# Patient Record
Sex: FEMALE | Race: WHITE | NOT HISPANIC OR LATINO | Employment: OTHER | URBAN - METROPOLITAN AREA
[De-identification: names, ages, dates, MRNs, and addresses within clinical notes are randomized per-mention and may not be internally consistent; named-entity substitution may affect disease eponyms.]

---

## 2020-08-27 ENCOUNTER — TELEPHONE (OUTPATIENT)
Dept: NEUROLOGY | Facility: HOSPITAL | Age: 75
End: 2020-08-27

## 2020-08-27 DIAGNOSIS — Z03.818 ENCOUNTER FOR OBSERVATION FOR SUSPECTED EXPOSURE TO OTHER BIOLOGICAL AGENTS RULED OUT: ICD-10-CM

## 2020-08-27 DIAGNOSIS — Z12.11 SPECIAL SCREENING FOR MALIGNANT NEOPLASMS, COLON: Primary | ICD-10-CM

## 2020-08-27 NOTE — TELEPHONE ENCOUNTER
New pt requesting colonoscopy with Dr. Dennison for fobt, positive stool.  Screen colonoscopy scheduled with pt on Monday, September 21, 2020 at [facility].  Pt informed covid 19 test required 72 hours prior to procedure., to be scheduled upon follow up call with pt.  Pt in the process of moving, request to be contacted with prep instructions in 1 week.  Pt will have previous gi send records to this clinic.  Pt given fax and mailing information and repeated correctly.

## 2020-09-17 ENCOUNTER — TELEPHONE (OUTPATIENT)
Dept: NEUROLOGY | Facility: HOSPITAL | Age: 75
End: 2020-09-17

## 2020-09-17 NOTE — TELEPHONE ENCOUNTER
Covid 19 test scheduled with pt on Friday, September 18, 2020 at 1130am at [facility] at [address] [facility], 1st floor.  Suprep instructions with pt and informed sent to Rolanda at [place] and [place]. SUPREP Instructions    [facility]  [address]  Clinic Office [phone]  Endoscopy Lab [phone]    You are scheduled for a Colonoscopy with  on Monday, September 21, 2020 at [facility].  You will check in at the Information desk on the first floor of the hospital. Please contact the office to reschedule if needed.     A responsible adult (family member or friend) must come with you and transport you home.  You are not allowed to drive, take a taxi/ride share or bus or leave the Endoscopy Center alone.  If you do not have a responsible adult with you to take you home, your exam will be cancelled.      Please follow instructions of  if you are taking anticoagulant/blood thinning medications such as Aggrenox, Brilinta, Effient, Eliquis, Lovenox, Plavix, Pletal, Pradaxa, Ticilid, Xarelto or Coumadin.      Please skip your morning dose of insulin or other oral medications for diabetes the morning of the procedure unless instructed otherwise by your doctor. You should take your blood pressure, heart, anti-rejection and or seizure medication the morning of your procedure.     To ensure that your test is accurate and complete, you must follow these instructions - please do not use the instructions provided from the pharmacy.      The Day Before The Procedure:     Follow a CLEAR LIQUID DIET for the entire day before your scheduled colonoscopy.  This means no solid food the entire day.   A clear liquid diet includes the following:  o Water, Coffee or decaffeinated coffee (no milk or cream)  o Tea, Herbal tea  o Carbonated beverages (soft drinks), regular and sugar free  o Gelatin  o Apple Juice, white grape juice, white  cranberry juice  o Gatorade, Power Aid, Crystal Light, Bhanu Aid (Yellow, Green, Clear)  o Lemonade and Limeade  o Bouillon, clear consomme'  o Snowball, popsicles  (Yellow, Green, Clear)    DO NOT DRINK ANY LIQUIDS CONTAINING RED DYE  DO NOT DRINK ANY LIQUIDS NOT SPECIFICALLY LISTED  DO NOT DRINK ALCOHOL     At 5 pm the evening before your colonoscopy:  o Pour one (1) bottle of SUPREP into the container provided in the box. Add water to the line on the container and mix well.  Drink the whole container and then drink 2 more containers of water over 1 hour.  - This is sometimes easier to drink if this solution is cold, so you can mix the solution 20 minutes ahead of time and place in the refrigerator prior to drinking.  You have to drink the solution within 30-45 minutes of mixing it.  Do NOT put this solution over ice.  It IS ok to drink with a straw.    The Day of the Procedure - The Endoscopy department will call you 2 days prior to your procedure to give you the exact time     5 hours prior to your ARRIVAL TIME (this may be in the middle of the night)  o Pour the 2nd bottle of SUPREP into the container provided in the box.  Add water to the line on the container and mix well.  Drink the whole container and then drink 2 more containers of water over 1 hour.    o AFTER YOU HAVE COMPLETED YOUR PREP, YOU MAY HAVE NOTHING ELSE BY MOUTH    o Please leave all valuables and jewelry at home.    o At 600 am, you may take the last dose of any medications you are allowed to take with a small sip of water (blood pressure, heart, anti-rejection and or seizure medication).  If you use inhalers bring them with you.    o You may call the Endoscopy department at [phone] with any questions regarding your procedure.

## 2020-09-18 ENCOUNTER — HOSPITAL ENCOUNTER (OUTPATIENT)
Dept: PREADMISSION TESTING | Facility: OTHER | Age: 75
Discharge: HOME | End: 2020-09-18
Attending: ANESTHESIOLOGY
Payer: MEDICARE

## 2020-09-18 DIAGNOSIS — Z03.818 ENCOUNTER FOR OBSERVATION FOR SUSPECTED EXPOSURE TO OTHER BIOLOGICAL AGENTS RULED OUT: ICD-10-CM

## 2020-09-18 LAB — SARS-COV-2 RNA RESP QL NAA+PROBE: NOT DETECTED

## 2020-09-18 PROCEDURE — U0003 INFECTIOUS AGENT DETECTION BY NUCLEIC ACID (DNA OR RNA); SEVERE ACUTE RESPIRATORY SYNDROME CORONAVIRUS 2 (SARS-COV-2) (CORONAVIRUS DISEASE [COVID-19]), AMPLIFIED PROBE TECHNIQUE, MAKING USE OF HIGH THROUGHPUT TECHNOLOGIES AS DESCRIBED BY CMS-2020-01-R: HCPCS

## 2020-09-20 NOTE — H&P
[facility]    CC: positive FOBT    HPI 75 y.o. female community referral otherwise healthy here to evaluate new, acute, painless, positive FOBT without overt GI blood loss. Last colonoscopy 2010 normal. No FHx colon cancer.    Chart reviewed and summarized here.    Past Medical History none    Past Surgical History none    Social History no tobacco, rare etoh, no drugs    Family History sister breast cancer    Review of Systems  General ROS: negative for chills, fever or weight loss  Psychological ROS: negative for hallucination, depression or suicidal ideation  Ophthalmic ROS: negative for blurry vision, photophobia or eye pain  ENT ROS: negative for epistaxis, sore throat or rhinorrhea  Respiratory ROS: no cough, shortness of breath, or wheezing  Cardiovascular ROS: no chest pain or dyspnea on exertion  Gastrointestinal ROS: no abdominal pain, change in bowel habits, or black/ bloody stools  Genito-Urinary ROS: no dysuria, trouble voiding, or hematuria  Musculoskeletal ROS: negative for gait disturbance or muscular weakness  Neurological ROS: no syncope or seizures; no ataxia  Dermatological ROS: negative for pruritis, rash and jaundice    Physical Examination  There were no vitals taken for this visit.  General appearance: alert, cooperative, no distress  HENT: Normocephalic, atraumatic, neck symmetrical, no nasal discharge   Eyes: conjunctivae/corneas clear, PERRL, EOM's intact  Lungs: clear to auscultation bilaterally, no dullness to percussion bilaterally  Heart: regular rate and rhythm without rub; no displacement of the PMI   Abdomen: soft, non-tender; bowel sounds normoactive; no organomegaly  Extremities: extremities symmetric; no clubbing, cyanosis, or edema  Integument: Skin color, texture, turgor normal; no rashes; hair distrubution normal  Neurologic: Alert and oriented X 3, normal strength, normal coordination and gait  Psychiatric: no pressured speech; normal affect; no evidence of impaired  cognition     Labs: personally reviewed cbc cmp nml 2017, FOBT positive    Outside records reviewed and summarized here    Assessment:   76yo F otherwise healthy with positive FOBT who's last colonoscopy in 2010 was normal.    Plan:   Proceed with colonoscopy today    George Dennison MD   [address], [address]   [place] [address] [phone]

## 2020-09-21 ENCOUNTER — HOSPITAL ENCOUNTER (OUTPATIENT)
Facility: HOSPITAL | Age: 75
Discharge: HOME | End: 2020-09-21
Attending: INTERNAL MEDICINE | Admitting: INTERNAL MEDICINE
Payer: MEDICARE

## 2020-09-21 ENCOUNTER — ANESTHESIA EVENT (OUTPATIENT)
Dept: ENDOSCOPY | Facility: HOSPITAL | Age: 75
End: 2020-09-21
Payer: MEDICARE

## 2020-09-21 ENCOUNTER — ANESTHESIA (OUTPATIENT)
Dept: ENDOSCOPY | Facility: HOSPITAL | Age: 75
End: 2020-09-21
Payer: MEDICARE

## 2020-09-21 VITALS
DIASTOLIC BLOOD PRESSURE: 86 MMHG | HEIGHT: 68 IN | BODY MASS INDEX: 23.49 KG/M2 | WEIGHT: 155 LBS | RESPIRATION RATE: 16 BRPM | TEMPERATURE: 98 F | HEART RATE: 52 BPM | OXYGEN SATURATION: 99 % | SYSTOLIC BLOOD PRESSURE: 106 MMHG

## 2020-09-21 DIAGNOSIS — Z12.11 COLON CANCER SCREENING: Primary | ICD-10-CM

## 2020-09-21 DIAGNOSIS — R19.5 FECAL OCCULT BLOOD TEST POSITIVE: ICD-10-CM

## 2020-09-21 PROCEDURE — 63600175 PHARM REV CODE 636 W HCPCS: Performed by: NURSE ANESTHETIST, CERTIFIED REGISTERED

## 2020-09-21 PROCEDURE — 45378 DIAGNOSTIC COLONOSCOPY: CPT | Performed by: INTERNAL MEDICINE

## 2020-09-21 PROCEDURE — 25000003 PHARM REV CODE 250: Performed by: INTERNAL MEDICINE

## 2020-09-21 PROCEDURE — 37000008 HC ANESTHESIA 1ST 15 MINUTES: Performed by: INTERNAL MEDICINE

## 2020-09-21 PROCEDURE — 37000009 HC ANESTHESIA EA ADD 15 MINS: Performed by: INTERNAL MEDICINE

## 2020-09-21 PROCEDURE — G0121 COLON CA SCRN NOT HI RSK IND: HCPCS | Performed by: INTERNAL MEDICINE

## 2020-09-21 PROCEDURE — 25000003 PHARM REV CODE 250: Performed by: NURSE ANESTHETIST, CERTIFIED REGISTERED

## 2020-09-21 RX ORDER — LIDOCAINE HCL/PF 100 MG/5ML
SYRINGE (ML) INTRAVENOUS
Status: DISCONTINUED | OUTPATIENT
Start: 2020-09-21 | End: 2020-09-21

## 2020-09-21 RX ORDER — CALCIUM CARBONATE 600 MG
600 TABLET ORAL ONCE
COMMUNITY

## 2020-09-21 RX ORDER — UBIDECARENONE 75 MG
500 CAPSULE ORAL DAILY
COMMUNITY

## 2020-09-21 RX ORDER — SODIUM CHLORIDE 0.9 % (FLUSH) 0.9 %
10 SYRINGE (ML) INJECTION
Status: DISCONTINUED | OUTPATIENT
Start: 2020-09-21 | End: 2020-09-21 | Stop reason: HOSPADM

## 2020-09-21 RX ORDER — MULTIVITAMIN
1 TABLET ORAL DAILY
COMMUNITY

## 2020-09-21 RX ORDER — SODIUM CHLORIDE 9 MG/ML
INJECTION, SOLUTION INTRAVENOUS CONTINUOUS
Status: DISCONTINUED | OUTPATIENT
Start: 2020-09-21 | End: 2020-09-21 | Stop reason: HOSPADM

## 2020-09-21 RX ORDER — PROPOFOL 10 MG/ML
VIAL (ML) INTRAVENOUS CONTINUOUS PRN
Status: DISCONTINUED | OUTPATIENT
Start: 2020-09-21 | End: 2020-09-21

## 2020-09-21 RX ORDER — PROPOFOL 10 MG/ML
VIAL (ML) INTRAVENOUS
Status: DISCONTINUED | OUTPATIENT
Start: 2020-09-21 | End: 2020-09-21

## 2020-09-21 RX ADMIN — PROPOFOL 150 MCG/KG/MIN: 10 INJECTION, EMULSION INTRAVENOUS at 09:09

## 2020-09-21 RX ADMIN — LIDOCAINE HYDROCHLORIDE 40 MG: 20 INJECTION, SOLUTION INTRAVENOUS at 09:09

## 2020-09-21 RX ADMIN — PROPOFOL 70 MG: 10 INJECTION, EMULSION INTRAVENOUS at 09:09

## 2020-09-21 RX ADMIN — SODIUM CHLORIDE: 0.9 INJECTION, SOLUTION INTRAVENOUS at 08:09

## 2020-09-21 NOTE — ANESTHESIA POSTPROCEDURE EVALUATION
Anesthesia Post Evaluation    Patient: Cindy Julian    Procedure(s) Performed: Procedure(s) (LRB):  COLONOSCOPY (N/A)    Final Anesthesia Type: MAC    Patient location during evaluation: GI PACU  Patient participation: Yes- Able to Participate  Level of consciousness: awake and alert  Post-procedure vital signs: reviewed and stable  Pain management: adequate  Airway patency: patent    PONV status at discharge: No PONV  Anesthetic complications: no      Cardiovascular status: hemodynamically stable  Respiratory status: unassisted, spontaneous ventilation and room air  Hydration status: euvolemic  Follow-up not needed.          Vitals Value Taken Time   /87 09/21/20 0806   Temp 36.8 °C (98.2 °F) 09/21/20 0805   Pulse 68 09/21/20 0805   Resp 18 09/21/20 0805   SpO2 98 % 09/21/20 0805         No case tracking events are documented in the log.      Pain/Rebekah Score: No data recorded

## 2020-09-21 NOTE — TRANSFER OF CARE
"Anesthesia Transfer of Care Note    Patient: Cindy Julian    Procedure(s) Performed: Procedure(s) (LRB):  COLONOSCOPY (N/A)    Patient location: GI    Anesthesia Type: MAC    Transport from OR: Transported from OR on room air with adequate spontaneous ventilation    Post pain: adequate analgesia    Post assessment: no apparent anesthetic complications and tolerated procedure well    Post vital signs: stable    Level of consciousness: responds to stimulation and sedated    Nausea/Vomiting: no nausea/vomiting    Complications: none    Transfer of care protocol was followed      Last vitals:   Visit Vitals  BP (!) 175/87   Pulse 68   Temp 36.8 °C (98.2 °F) (Temporal)   Resp 18   Ht 5' 8" (1.727 m)   Wt 70.3 kg (155 lb)   SpO2 98%   Breastfeeding No   BMI 23.57 kg/m²     "

## 2020-09-21 NOTE — [employer] PATIENT INSTRUCTIONS
Discharge Summary/Instructions after an Endoscopic Procedure  Patient Name: Cindy Gnun  Patient MRN: [id number]  Patient   YOB: 1945  Monday, September 21, 2020  George Dennison MD  Your health is very important to us during the Covid Crisis. Following your   procedure today, you will receive a daily text for 2 weeks asking about   signs or symptoms of Covid 19.  Please respond to this text when you   receive it so we can follow up and keep you as safe as possible.   RESTRICTIONS:  During your procedure today, you received medications for sedation.  These   medications may affect your judgment, balance and coordination.  Therefore,   for 24 hours, you have the following restrictions:   - DO NOT drive a car, operate machinery, make legal/financial decisions,   sign important papers or drink alcohol.    ACTIVITY:  Today: no heavy lifting, straining or running due to procedural   sedation/anesthesia.  The following day: return to full activity including work.  DIET:  Eat and drink normally unless instructed otherwise.     TREATMENT FOR COMMON SIDE EFFECTS:  - Mild abdominal pain, nausea, belching, bloating or excessive gas:  rest,   eat lightly and use a heating pad.  - Sore Throat: treat with throat lozenges and/or gargle with warm salt   water.  - Because air was used during the procedure, expelling large amounts of air   from your rectum or belching is normal.  - If a bowel prep was taken, you may not have a bowel movement for 1-3 days.    This is normal.  SYMPTOMS TO WATCH FOR AND REPORT TO YOUR PHYSICIAN:  1. Abdominal pain or bloating, other than gas cramps.  2. Chest pain.  3. Back pain.  4. Signs of infection such as: chills or fever occurring within 24 hours   after the procedure.  5. Rectal bleeding, which would show as bright red, maroon, or black stools.   (A tablespoon of blood from the rectum is not serious, especially if   hemorrhoids are present.)  6. Vomiting.  7. Weakness or  dizziness.  GO DIRECTLY TO THE NEAREST EMERGENCY ROOM IF YOU HAVE ANY OF THE FOLLOWING:      Difficulty breathing              Chills and/or fever over 101 F   Persistent vomiting and/or vomiting blood   Severe abdominal pain   Severe chest pain   Black, tarry stools   Bleeding- more than one tablespoon   Any other symptom or condition that you feel may need urgent attention  Your doctor recommends these additional instructions:  If any biopsies were taken, your doctors clinic will contact you in 1 to 2   weeks with any results.  Repeat screening colonoscopy in 10 years.  Discharge to home   Condition stable   Resume previous diet   The signs and symptoms of potential delayed complications were discussed   with the patient. If signs or symptoms of these complications develop, call   the [facility] at [phone].   Return to normal activities tomorrow.  Written discharge instructions were   provided to the patient.   - Resume previous diet.   - Repeat colonoscopy in 10 years for screening purposes.   - Discharge patient to home.  For questions, problems or results please call your physician - George Dennison MD.  EMERGENCY PHONE NUMBER: [phone],  LAB RESULTS: [phone]  IF A COMPLICATION OR EMERGENCY SITUATION ARISES AND YOU ARE UNABLE TO REACH   YOUR PHYSICIAN - GO DIRECTLY TO THE EMERGENCY ROOM.  MD George Sarmiento MD  9/21/2020 10:59:22 AM  This report has been verified and signed electronically.  [employer]

## 2020-09-21 NOTE — ANESTHESIA PREPROCEDURE EVALUATION
09/21/2020  Cindy Julian is a 75 y.o., female for colonoscopy under MAC    History reviewed. No pertinent past medical history.  History reviewed. No pertinent surgical history.      Anesthesia Evaluation    I have reviewed the Patient Summary Reports.   I have reviewed the NPO Status.   I have reviewed the Medications.     Review of Systems  Social:  Non-Smoker        Physical Exam  General:  Well nourished    Airway/Jaw/Neck:  Airway Findings: Mallampati: II      Chest/Lungs:  Chest/Lungs Clear    Heart/Vascular:  Heart Findings: Normal            Anesthesia Plan  Type of Anesthesia, risks & benefits discussed:  Anesthesia Type:  MAC  Patient's Preference:   Intra-op Monitoring Plan: standard ASA monitors  Intra-op Monitoring Plan Comments:   Post Op Pain Control Plan:   Post Op Pain Control Plan Comments:   Induction:    Beta Blocker:  Patient is not currently on a Beta-Blocker (No further documentation required).       Informed Consent: Patient understands risks and agrees with Anesthesia plan.  Questions answered. Anesthesia consent signed with patient.  ASA Score: 1     Day of Surgery Review of History & Physical:            Ready For Surgery From Anesthesia Perspective.

## 2021-01-09 ENCOUNTER — IMMUNIZATION (OUTPATIENT)
Dept: INTERNAL MEDICINE | Facility: CLINIC | Age: 76
End: 2021-01-09
Payer: MEDICARE

## 2021-01-09 DIAGNOSIS — Z23 NEED FOR VACCINATION: ICD-10-CM

## 2021-01-09 PROCEDURE — 91300 COVID-19, MRNA, LNP-S, PF, 30 MCG/0.3 ML DOSE VACCINE: CPT | Mod: PBBFAC | Performed by: FAMILY MEDICINE

## 2021-01-30 ENCOUNTER — IMMUNIZATION (OUTPATIENT)
Dept: INTERNAL MEDICINE | Facility: CLINIC | Age: 76
End: 2021-01-30
Payer: MEDICARE

## 2021-01-30 DIAGNOSIS — Z23 NEED FOR VACCINATION: Primary | ICD-10-CM

## 2021-01-30 PROCEDURE — 91300 COVID-19, MRNA, LNP-S, PF, 30 MCG/0.3 ML DOSE VACCINE: CPT | Mod: PBBFAC | Performed by: FAMILY MEDICINE

## 2021-01-30 PROCEDURE — 0002A COVID-19, MRNA, LNP-S, PF, 30 MCG/0.3 ML DOSE VACCINE: CPT | Mod: PBBFAC | Performed by: FAMILY MEDICINE

## 2021-03-18 ENCOUNTER — PATIENT MESSAGE (OUTPATIENT)
Dept: RESEARCH | Facility: HOSPITAL | Age: 76
End: 2021-03-18

## 2021-03-26 ENCOUNTER — PATIENT MESSAGE (OUTPATIENT)
Dept: RESEARCH | Facility: HOSPITAL | Age: 76
End: 2021-03-26

## 2021-11-19 ENCOUNTER — HOSPITAL ENCOUNTER (EMERGENCY)
Facility: HOSPITAL | Age: 76
Discharge: HOME | End: 2021-11-19
Attending: EMERGENCY MEDICINE
Payer: MEDICARE

## 2021-11-19 VITALS
DIASTOLIC BLOOD PRESSURE: 81 MMHG | WEIGHT: 155 LBS | OXYGEN SATURATION: 96 % | TEMPERATURE: 98 F | RESPIRATION RATE: 18 BRPM | HEART RATE: 61 BPM | HEIGHT: 68 IN | SYSTOLIC BLOOD PRESSURE: 184 MMHG | BODY MASS INDEX: 23.49 KG/M2

## 2021-11-19 DIAGNOSIS — S52.502A CLOSED FRACTURE OF DISTAL END OF LEFT RADIUS, UNSPECIFIED FRACTURE MORPHOLOGY, INITIAL ENCOUNTER: Primary | ICD-10-CM

## 2021-11-19 DIAGNOSIS — S69.90XA WRIST INJURY: ICD-10-CM

## 2021-11-19 PROBLEM — S62.102A CLOSED FRACTURE OF LEFT WRIST: Status: ACTIVE | Noted: 2021-11-19

## 2021-11-19 PROCEDURE — 99283 EMERGENCY DEPT VISIT LOW MDM: CPT | Mod: 57,,, | Performed by: ORTHOPAEDIC SURGERY

## 2021-11-19 PROCEDURE — 25600 CLTX DST RDL FX/EPHYS SEP WO: CPT | Mod: LT,,, | Performed by: ORTHOPAEDIC SURGERY

## 2021-11-19 PROCEDURE — 99284 EMERGENCY DEPT VISIT MOD MDM: CPT | Mod: ,,, | Performed by: EMERGENCY MEDICINE

## 2021-11-19 PROCEDURE — 99284 PR EMERGENCY DEPT VISIT,LEVEL IV: ICD-10-PCS | Mod: ,,, | Performed by: EMERGENCY MEDICINE

## 2021-11-19 PROCEDURE — 99285 EMERGENCY DEPT VISIT HI MDM: CPT | Mod: 25

## 2021-11-19 PROCEDURE — 25605 CLTX DST RDL FX/EPHYS SEP W/: CPT

## 2021-11-19 PROCEDURE — 25600 ORTHOPEDIC INJURY TREATMENT: ICD-10-PCS | Mod: LT,,, | Performed by: ORTHOPAEDIC SURGERY

## 2021-11-19 PROCEDURE — 99283 PR EMERGENCY DEPT VISIT,LEVEL III: ICD-10-PCS | Mod: 57,,, | Performed by: ORTHOPAEDIC SURGERY

## 2021-11-22 ENCOUNTER — TELEPHONE (OUTPATIENT)
Dept: ORTHOPEDICS | Facility: CLINIC | Age: 76
End: 2021-11-22
Payer: MEDICARE

## 2021-11-22 DIAGNOSIS — M25.532 LEFT WRIST PAIN: Primary | ICD-10-CM

## 2021-11-23 ENCOUNTER — OFFICE VISIT (OUTPATIENT)
Dept: ORTHOPEDICS | Facility: CLINIC | Age: 76
End: 2021-11-23
Payer: MEDICARE

## 2021-11-23 VITALS
WEIGHT: 155 LBS | DIASTOLIC BLOOD PRESSURE: 86 MMHG | BODY MASS INDEX: 23.49 KG/M2 | HEART RATE: 74 BPM | SYSTOLIC BLOOD PRESSURE: 134 MMHG | HEIGHT: 68 IN

## 2021-11-23 DIAGNOSIS — S52.502A CLOSED FRACTURE OF DISTAL END OF LEFT RADIUS, UNSPECIFIED FRACTURE MORPHOLOGY, INITIAL ENCOUNTER: Primary | ICD-10-CM

## 2021-11-23 PROCEDURE — 99999 PR PBB SHADOW E&M-EST. PATIENT-LVL III: CPT | Mod: PBBFAC,,, | Performed by: ORTHOPAEDIC SURGERY

## 2021-11-23 PROCEDURE — 99204 PR OFFICE/OUTPT VISIT, NEW, LEVL IV, 45-59 MIN: ICD-10-PCS | Mod: S$GLB,,, | Performed by: ORTHOPAEDIC SURGERY

## 2021-11-23 PROCEDURE — 99999 PR PBB SHADOW E&M-EST. PATIENT-LVL III: ICD-10-PCS | Mod: PBBFAC,,, | Performed by: ORTHOPAEDIC SURGERY

## 2021-11-23 PROCEDURE — 99204 OFFICE O/P NEW MOD 45 MIN: CPT | Mod: S$GLB,,, | Performed by: ORTHOPAEDIC SURGERY

## 2021-11-24 ENCOUNTER — ANESTHESIA EVENT (OUTPATIENT)
Dept: SURGERY | Facility: HOSPITAL | Age: 76
End: 2021-11-24
Payer: MEDICARE

## 2021-11-26 ENCOUNTER — TELEPHONE (OUTPATIENT)
Dept: ORTHOPEDICS | Facility: CLINIC | Age: 76
End: 2021-11-26
Payer: MEDICARE

## 2021-11-26 DIAGNOSIS — Z98.890 POST-OPERATIVE STATE: Primary | ICD-10-CM

## 2021-11-26 RX ORDER — IBUPROFEN 600 MG/1
600 TABLET ORAL 3 TIMES DAILY PRN
Qty: 45 TABLET | Refills: 0 | Status: SHIPPED | OUTPATIENT
Start: 2021-11-26

## 2021-11-26 RX ORDER — OXYCODONE AND ACETAMINOPHEN 5; 325 MG/1; MG/1
1 TABLET ORAL EVERY 6 HOURS PRN
Qty: 10 TABLET | Refills: 0 | Status: SHIPPED | OUTPATIENT
Start: 2021-11-26

## 2021-11-26 RX ORDER — ACETAMINOPHEN 500 MG
1000 TABLET ORAL 2 TIMES DAILY PRN
Qty: 48 TABLET | Refills: 0 | Status: SHIPPED | OUTPATIENT
Start: 2021-11-26

## 2021-11-26 RX ORDER — OXYCODONE AND ACETAMINOPHEN 10; 325 MG/1; MG/1
1 TABLET ORAL EVERY 6 HOURS PRN
Qty: 10 TABLET | Refills: 0 | Status: SHIPPED | OUTPATIENT
Start: 2021-11-26 | End: 2021-11-26

## 2021-11-27 DIAGNOSIS — S52.509A DISTAL RADIUS FRACTURE: ICD-10-CM

## 2021-11-29 ENCOUNTER — ANESTHESIA (OUTPATIENT)
Dept: SURGERY | Facility: HOSPITAL | Age: 76
End: 2021-11-29
Payer: MEDICARE

## 2021-11-29 ENCOUNTER — HOSPITAL ENCOUNTER (OUTPATIENT)
Facility: HOSPITAL | Age: 76
Discharge: HOME | End: 2021-11-29
Attending: ORTHOPAEDIC SURGERY | Admitting: ORTHOPAEDIC SURGERY
Payer: MEDICARE

## 2021-11-29 VITALS
OXYGEN SATURATION: 100 % | DIASTOLIC BLOOD PRESSURE: 70 MMHG | SYSTOLIC BLOOD PRESSURE: 153 MMHG | TEMPERATURE: 98 F | HEART RATE: 50 BPM | HEIGHT: 68 IN | WEIGHT: 152 LBS | RESPIRATION RATE: 16 BRPM | BODY MASS INDEX: 23.04 KG/M2

## 2021-11-29 DIAGNOSIS — S52.502A CLOSED FRACTURE OF DISTAL END OF LEFT RADIUS, UNSPECIFIED FRACTURE MORPHOLOGY, INITIAL ENCOUNTER: Primary | ICD-10-CM

## 2021-11-29 DIAGNOSIS — S52.509A DISTAL RADIUS FRACTURE: ICD-10-CM

## 2021-11-29 PROCEDURE — 63600175 PHARM REV CODE 636 W HCPCS: Performed by: STUDENT IN AN ORGANIZED HEALTH CARE EDUCATION/TRAINING PROGRAM

## 2021-11-29 PROCEDURE — 25609 PR OPEN RX DISTAL RADIUS FX, INTRA-ARTICULAR, 3+ FRAG: ICD-10-PCS | Mod: LT,,, | Performed by: ORTHOPAEDIC SURGERY

## 2021-11-29 PROCEDURE — C1769 GUIDE WIRE: HCPCS | Performed by: ORTHOPAEDIC SURGERY

## 2021-11-29 PROCEDURE — 25609 OPTX DST RD XART FX/EP SEP3+: CPT | Mod: LT,,, | Performed by: ORTHOPAEDIC SURGERY

## 2021-11-29 PROCEDURE — D9220A PRA ANESTHESIA: Mod: ANES,,, | Performed by: ANESTHESIOLOGY

## 2021-11-29 PROCEDURE — D9220A PRA ANESTHESIA: ICD-10-PCS | Mod: CRNA,,, | Performed by: NURSE ANESTHETIST, CERTIFIED REGISTERED

## 2021-11-29 PROCEDURE — 64415 PR NERVE BLOCK INJ, ANES/STEROID, BRACHIAL PLEXUS, INCL IMAG GUIDANCE: ICD-10-PCS | Mod: 59,LT,, | Performed by: ANESTHESIOLOGY

## 2021-11-29 PROCEDURE — 25000003 PHARM REV CODE 250: Performed by: ANESTHESIOLOGY

## 2021-11-29 PROCEDURE — 63600175 PHARM REV CODE 636 W HCPCS: Performed by: NURSE ANESTHETIST, CERTIFIED REGISTERED

## 2021-11-29 PROCEDURE — 25000003 PHARM REV CODE 250: Performed by: ORTHOPAEDIC SURGERY

## 2021-11-29 PROCEDURE — 64415 NJX AA&/STRD BRCH PLXS IMG: CPT | Mod: 59,LT,, | Performed by: ANESTHESIOLOGY

## 2021-11-29 PROCEDURE — 71000015 HC POSTOP RECOV 1ST HR: Performed by: ORTHOPAEDIC SURGERY

## 2021-11-29 PROCEDURE — C1713 ANCHOR/SCREW BN/BN,TIS/BN: HCPCS | Performed by: ORTHOPAEDIC SURGERY

## 2021-11-29 PROCEDURE — 76942 PR U/S GUIDANCE FOR NEEDLE GUIDANCE: ICD-10-PCS | Mod: 26,,, | Performed by: ANESTHESIOLOGY

## 2021-11-29 PROCEDURE — 99900035 HC TECH TIME PER 15 MIN (STAT)

## 2021-11-29 PROCEDURE — 25000003 PHARM REV CODE 250: Performed by: NURSE ANESTHETIST, CERTIFIED REGISTERED

## 2021-11-29 PROCEDURE — 37000008 HC ANESTHESIA 1ST 15 MINUTES: Performed by: ORTHOPAEDIC SURGERY

## 2021-11-29 PROCEDURE — 76942 ECHO GUIDE FOR BIOPSY: CPT | Performed by: ANESTHESIOLOGY

## 2021-11-29 PROCEDURE — 37000009 HC ANESTHESIA EA ADD 15 MINS: Performed by: ORTHOPAEDIC SURGERY

## 2021-11-29 PROCEDURE — 27201423 OPTIME MED/SURG SUP & DEVICES STERILE SUPPLY: Performed by: ORTHOPAEDIC SURGERY

## 2021-11-29 PROCEDURE — 63600175 PHARM REV CODE 636 W HCPCS: Performed by: ANESTHESIOLOGY

## 2021-11-29 PROCEDURE — 94761 N-INVAS EAR/PLS OXIMETRY MLT: CPT

## 2021-11-29 PROCEDURE — D9220A PRA ANESTHESIA: ICD-10-PCS | Mod: ANES,,, | Performed by: ANESTHESIOLOGY

## 2021-11-29 PROCEDURE — 36000710: Performed by: ORTHOPAEDIC SURGERY

## 2021-11-29 PROCEDURE — 36000711: Performed by: ORTHOPAEDIC SURGERY

## 2021-11-29 PROCEDURE — D9220A PRA ANESTHESIA: Mod: CRNA,,, | Performed by: NURSE ANESTHETIST, CERTIFIED REGISTERED

## 2021-11-29 PROCEDURE — 71000033 HC RECOVERY, INTIAL HOUR: Performed by: ORTHOPAEDIC SURGERY

## 2021-11-29 PROCEDURE — 76942 ECHO GUIDE FOR BIOPSY: CPT | Mod: 26,,, | Performed by: ANESTHESIOLOGY

## 2021-11-29 DEVICE — SCREW BNE N LOK HEXLB 3.5X12: Type: IMPLANTABLE DEVICE | Site: ARM | Status: FUNCTIONAL

## 2021-11-29 DEVICE — SCREW BONE LOK CONG 2.3X20MM: Type: IMPLANTABLE DEVICE | Site: ARM | Status: FUNCTIONAL

## 2021-11-29 DEVICE — SCREW BONE 2.3 X 16MM: Type: IMPLANTABLE DEVICE | Site: ARM | Status: FUNCTIONAL

## 2021-11-29 DEVICE — SCREW BONE 2.3 X 18MM: Type: IMPLANTABLE DEVICE | Site: ARM | Status: FUNCTIONAL

## 2021-11-29 DEVICE — GUIDEWIRE ORTHO .054 X 6 IN: Type: IMPLANTABLE DEVICE | Site: ARM | Status: FUNCTIONAL

## 2021-11-29 DEVICE — PLATE BONE ACULOC 2 STANDARD: Type: IMPLANTABLE DEVICE | Site: ARM | Status: FUNCTIONAL

## 2021-11-29 RX ORDER — BUPIVACAINE HYDROCHLORIDE AND EPINEPHRINE 5; 5 MG/ML; UG/ML
INJECTION, SOLUTION EPIDURAL; INTRACAUDAL; PERINEURAL
Status: COMPLETED | OUTPATIENT
Start: 2021-11-29 | End: 2021-11-29

## 2021-11-29 RX ORDER — ONDANSETRON 4 MG/1
8 TABLET, ORALLY DISINTEGRATING ORAL EVERY 8 HOURS PRN
Status: CANCELLED | OUTPATIENT
Start: 2021-11-29

## 2021-11-29 RX ORDER — SODIUM CHLORIDE 0.9 % (FLUSH) 0.9 %
10 SYRINGE (ML) INJECTION
Status: DISCONTINUED | OUTPATIENT
Start: 2021-11-29 | End: 2021-11-29 | Stop reason: HOSPADM

## 2021-11-29 RX ORDER — ACETAMINOPHEN 500 MG
1000 TABLET ORAL
Status: COMPLETED | OUTPATIENT
Start: 2021-11-29 | End: 2021-11-29

## 2021-11-29 RX ORDER — PROPOFOL 10 MG/ML
VIAL (ML) INTRAVENOUS
Status: DISCONTINUED | OUTPATIENT
Start: 2021-11-29 | End: 2021-11-29

## 2021-11-29 RX ORDER — CEFAZOLIN SODIUM 1 G/3ML
2 INJECTION, POWDER, FOR SOLUTION INTRAMUSCULAR; INTRAVENOUS
Status: COMPLETED | OUTPATIENT
Start: 2021-11-29 | End: 2021-11-29

## 2021-11-29 RX ORDER — OXYCODONE HYDROCHLORIDE 5 MG/1
5 TABLET ORAL EVERY 4 HOURS PRN
Status: CANCELLED | OUTPATIENT
Start: 2021-11-29

## 2021-11-29 RX ORDER — LIDOCAINE HYDROCHLORIDE 20 MG/ML
INJECTION INTRAVENOUS
Status: DISCONTINUED | OUTPATIENT
Start: 2021-11-29 | End: 2021-11-29

## 2021-11-29 RX ORDER — ACETAMINOPHEN 325 MG/1
650 TABLET ORAL EVERY 4 HOURS PRN
Status: CANCELLED | OUTPATIENT
Start: 2021-11-29

## 2021-11-29 RX ORDER — FENTANYL CITRATE 50 UG/ML
25 INJECTION, SOLUTION INTRAMUSCULAR; INTRAVENOUS EVERY 5 MIN PRN
Status: CANCELLED | OUTPATIENT
Start: 2021-11-29

## 2021-11-29 RX ORDER — PROPOFOL 10 MG/ML
VIAL (ML) INTRAVENOUS CONTINUOUS PRN
Status: DISCONTINUED | OUTPATIENT
Start: 2021-11-29 | End: 2021-11-29

## 2021-11-29 RX ORDER — ONDANSETRON 2 MG/ML
INJECTION INTRAMUSCULAR; INTRAVENOUS
Status: DISCONTINUED | OUTPATIENT
Start: 2021-11-29 | End: 2021-11-29

## 2021-11-29 RX ORDER — SODIUM CHLORIDE 0.9 % (FLUSH) 0.9 %
10 SYRINGE (ML) INJECTION
Status: CANCELLED | OUTPATIENT
Start: 2021-11-29

## 2021-11-29 RX ORDER — FENTANYL CITRATE 50 UG/ML
100 INJECTION, SOLUTION INTRAMUSCULAR; INTRAVENOUS
Status: DISCONTINUED | OUTPATIENT
Start: 2021-11-29 | End: 2021-11-29

## 2021-11-29 RX ORDER — MIDAZOLAM HYDROCHLORIDE 1 MG/ML
4 INJECTION INTRAMUSCULAR; INTRAVENOUS
Status: DISCONTINUED | OUTPATIENT
Start: 2021-11-29 | End: 2021-11-29

## 2021-11-29 RX ORDER — BACITRACIN ZINC 500 UNIT/G
OINTMENT (GRAM) TOPICAL
Status: DISCONTINUED | OUTPATIENT
Start: 2021-11-29 | End: 2021-11-29 | Stop reason: HOSPADM

## 2021-11-29 RX ORDER — CELECOXIB 200 MG/1
400 CAPSULE ORAL
Status: COMPLETED | OUTPATIENT
Start: 2021-11-29 | End: 2021-11-29

## 2021-11-29 RX ORDER — MIDAZOLAM HYDROCHLORIDE 1 MG/ML
0.5 INJECTION INTRAMUSCULAR; INTRAVENOUS
Status: DISCONTINUED | OUTPATIENT
Start: 2021-11-29 | End: 2021-11-29 | Stop reason: HOSPADM

## 2021-11-29 RX ORDER — FENTANYL CITRATE 50 UG/ML
25 INJECTION, SOLUTION INTRAMUSCULAR; INTRAVENOUS
Status: DISCONTINUED | OUTPATIENT
Start: 2021-11-29 | End: 2021-11-29 | Stop reason: HOSPADM

## 2021-11-29 RX ADMIN — PROPOFOL 50 MCG/KG/MIN: 10 INJECTION, EMULSION INTRAVENOUS at 11:11

## 2021-11-29 RX ADMIN — PROPOFOL 50 MG: 10 INJECTION, EMULSION INTRAVENOUS at 11:11

## 2021-11-29 RX ADMIN — CELECOXIB 400 MG: 200 CAPSULE ORAL at 09:11

## 2021-11-29 RX ADMIN — ACETAMINOPHEN 1000 MG: 500 TABLET ORAL at 09:11

## 2021-11-29 RX ADMIN — SODIUM CHLORIDE: 9 INJECTION, SOLUTION INTRAVENOUS at 11:11

## 2021-11-29 RX ADMIN — BUPIVACAINE HYDROCHLORIDE AND EPINEPHRINE BITARTRATE 30 ML: 5; .0091 INJECTION, SOLUTION EPIDURAL; INTRACAUDAL; PERINEURAL at 10:11

## 2021-11-29 RX ADMIN — MIDAZOLAM HYDROCHLORIDE 2 MG: 1 INJECTION, SOLUTION INTRAMUSCULAR; INTRAVENOUS at 10:11

## 2021-11-29 RX ADMIN — CEFAZOLIN 2 G: 330 INJECTION, POWDER, FOR SOLUTION INTRAMUSCULAR; INTRAVENOUS at 12:11

## 2021-11-29 RX ADMIN — LIDOCAINE HYDROCHLORIDE 75 MG: 20 INJECTION, SOLUTION INTRAVENOUS at 11:11

## 2021-11-29 RX ADMIN — ONDANSETRON 4 MG: 2 INJECTION, SOLUTION INTRAMUSCULAR; INTRAVENOUS at 12:11

## 2021-11-29 RX ADMIN — FENTANYL CITRATE 100 MCG: 50 INJECTION INTRAMUSCULAR; INTRAVENOUS at 10:11

## 2021-11-30 DIAGNOSIS — S52.502A CLOSED FRACTURE OF DISTAL END OF LEFT RADIUS, UNSPECIFIED FRACTURE MORPHOLOGY, INITIAL ENCOUNTER: Primary | ICD-10-CM

## 2021-12-08 ENCOUNTER — TELEPHONE (OUTPATIENT)
Dept: REHABILITATION | Facility: HOSPITAL | Age: 76
End: 2021-12-08
Payer: MEDICARE

## 2021-12-14 ENCOUNTER — OFFICE VISIT (OUTPATIENT)
Dept: ORTHOPEDICS | Facility: CLINIC | Age: 76
End: 2021-12-14
Payer: MEDICARE

## 2021-12-14 VITALS
BODY MASS INDEX: 23.04 KG/M2 | HEIGHT: 68 IN | HEART RATE: 74 BPM | SYSTOLIC BLOOD PRESSURE: 147 MMHG | DIASTOLIC BLOOD PRESSURE: 90 MMHG | WEIGHT: 152 LBS

## 2021-12-14 DIAGNOSIS — Z47.89 ORTHOPEDIC AFTERCARE: ICD-10-CM

## 2021-12-14 DIAGNOSIS — S52.502A CLOSED FRACTURE OF DISTAL END OF LEFT RADIUS, UNSPECIFIED FRACTURE MORPHOLOGY, INITIAL ENCOUNTER: Primary | ICD-10-CM

## 2021-12-14 PROCEDURE — 97760 PR ORTHOTIC MGMT&TRAINJ INITIAL ENC EA 15 MINS: ICD-10-PCS | Mod: GP,S$GLB,, | Performed by: PHYSICIAN ASSISTANT

## 2021-12-14 PROCEDURE — 99024 PR POST-OP FOLLOW-UP VISIT: ICD-10-PCS | Mod: S$GLB,,, | Performed by: PHYSICIAN ASSISTANT

## 2021-12-14 PROCEDURE — 97760 ORTHOTIC MGMT&TRAING 1ST ENC: CPT | Mod: GP,S$GLB,, | Performed by: PHYSICIAN ASSISTANT

## 2021-12-14 PROCEDURE — 99024 POSTOP FOLLOW-UP VISIT: CPT | Mod: S$GLB,,, | Performed by: PHYSICIAN ASSISTANT

## 2021-12-14 PROCEDURE — 99999 PR PBB SHADOW E&M-EST. PATIENT-LVL III: ICD-10-PCS | Mod: PBBFAC,,, | Performed by: PHYSICIAN ASSISTANT

## 2021-12-14 PROCEDURE — 99999 PR PBB SHADOW E&M-EST. PATIENT-LVL III: CPT | Mod: PBBFAC,,, | Performed by: PHYSICIAN ASSISTANT

## 2021-12-22 ENCOUNTER — CLINICAL SUPPORT (OUTPATIENT)
Dept: REHABILITATION | Facility: HOSPITAL | Age: 76
End: 2021-12-22
Payer: MEDICARE

## 2021-12-22 DIAGNOSIS — R29.898 DECREASED GRIP STRENGTH OF LEFT HAND: ICD-10-CM

## 2021-12-22 DIAGNOSIS — S52.502A CLOSED FRACTURE OF DISTAL END OF LEFT RADIUS, UNSPECIFIED FRACTURE MORPHOLOGY, INITIAL ENCOUNTER: ICD-10-CM

## 2021-12-22 DIAGNOSIS — M25.532 WRIST PAIN, LEFT: ICD-10-CM

## 2021-12-22 DIAGNOSIS — M25.60 RANGE OF MOTION DEFICIT: ICD-10-CM

## 2021-12-22 PROCEDURE — 97165 OT EVAL LOW COMPLEX 30 MIN: CPT

## 2021-12-22 PROCEDURE — 97110 THERAPEUTIC EXERCISES: CPT

## 2021-12-22 PROCEDURE — 97018 PARAFFIN BATH THERAPY: CPT | Mod: 59

## 2022-01-10 NOTE — PROGRESS NOTES
"Ms. Julian is here today for a post-operative visit.  She is 43 days status post ORIF left distal radius by Dr. De Leon on 11/29/2021. She reports that she is doing well. She is attending OT and regularly performing HEP.  She is icing as needed. She has been weaning out of the brace at home. Pain is mild. She denies fever, chills, and sweats since the time of the surgery.     Physical exam:    Vitals:    01/11/22 1124   BP: (!) 153/94   Pulse: 86   Weight: 68.9 kg (152 lb)   Height: 5' 8" (1.727 m)   PainSc:   4     Vital signs are stable, patient is afebrile.  Patient is well dressed and well groomed, no acute distress.  Alert and oriented to person, place, and time.  Incision is healing well - clean, dry, and intact.  There is no erythema or exudate.  There is no sign of any infection. She is NVI.   Good finger ROM, fair wrist ROM.  Good pronation and supination.    Assessment: 43 days status post ORIF left distal radius    Plan:  Cindy was seen today for post-op evaluation.    Diagnoses and all orders for this visit:    Closed fracture of distal end of left radius, unspecified fracture morphology, initial encounter    Orthopedic aftercare          - left forearm brace - wean out  - Regular OT and HEP  - No lifting or weight bearing, will gradually strengthen in OT  - Discussed XRay, will watch scapholunate interval;  Xrays reviewed and discussed with Dr. De Leon as well  - Follow up in 4-6 weeks with XRay  - Call with questions or concerns        PLAN FOR THE PATIENT: Pt will be NWB of operative extremity for 6 weeks. Pt will return to clinic at 2 weeks postop for a wound check and will be placed in a removable wrist brace to begin ROM at the wrist    "

## 2022-01-11 ENCOUNTER — CLINICAL SUPPORT (OUTPATIENT)
Dept: REHABILITATION | Facility: HOSPITAL | Age: 77
End: 2022-01-11
Payer: MEDICARE

## 2022-01-11 ENCOUNTER — TELEPHONE (OUTPATIENT)
Dept: ORTHOPEDICS | Facility: CLINIC | Age: 77
End: 2022-01-11
Payer: MEDICARE

## 2022-01-11 ENCOUNTER — HOSPITAL ENCOUNTER (OUTPATIENT)
Dept: RADIOLOGY | Facility: OTHER | Age: 77
Discharge: HOME | End: 2022-01-11
Attending: PHYSICIAN ASSISTANT
Payer: MEDICARE

## 2022-01-11 ENCOUNTER — OFFICE VISIT (OUTPATIENT)
Dept: ORTHOPEDICS | Facility: CLINIC | Age: 77
End: 2022-01-11
Payer: MEDICARE

## 2022-01-11 VITALS
SYSTOLIC BLOOD PRESSURE: 153 MMHG | BODY MASS INDEX: 23.04 KG/M2 | HEIGHT: 68 IN | DIASTOLIC BLOOD PRESSURE: 94 MMHG | HEART RATE: 86 BPM | WEIGHT: 152 LBS

## 2022-01-11 DIAGNOSIS — M25.60 RANGE OF MOTION DEFICIT: ICD-10-CM

## 2022-01-11 DIAGNOSIS — S52.502A CLOSED FRACTURE OF DISTAL END OF LEFT RADIUS, UNSPECIFIED FRACTURE MORPHOLOGY, INITIAL ENCOUNTER: ICD-10-CM

## 2022-01-11 DIAGNOSIS — Z47.89 ORTHOPEDIC AFTERCARE: ICD-10-CM

## 2022-01-11 DIAGNOSIS — S52.502A CLOSED FRACTURE OF DISTAL END OF LEFT RADIUS, UNSPECIFIED FRACTURE MORPHOLOGY, INITIAL ENCOUNTER: Primary | ICD-10-CM

## 2022-01-11 DIAGNOSIS — R29.898 DECREASED GRIP STRENGTH OF LEFT HAND: ICD-10-CM

## 2022-01-11 DIAGNOSIS — M25.532 WRIST PAIN, LEFT: ICD-10-CM

## 2022-01-11 PROCEDURE — 97140 MANUAL THERAPY 1/> REGIONS: CPT

## 2022-01-11 PROCEDURE — 3080F PR MOST RECENT DIASTOLIC BLOOD PRESSURE >= 90 MM HG: ICD-10-PCS | Mod: CPTII,S$GLB,, | Performed by: PHYSICIAN ASSISTANT

## 2022-01-11 PROCEDURE — 3077F PR MOST RECENT SYSTOLIC BLOOD PRESSURE >= 140 MM HG: ICD-10-PCS | Mod: CPTII,S$GLB,, | Performed by: PHYSICIAN ASSISTANT

## 2022-01-11 PROCEDURE — 97018 PARAFFIN BATH THERAPY: CPT | Mod: 59

## 2022-01-11 PROCEDURE — 73110 X-RAY EXAM OF WRIST: CPT | Mod: TC,FY,LT

## 2022-01-11 PROCEDURE — 1160F RVW MEDS BY RX/DR IN RCRD: CPT | Mod: CPTII,S$GLB,, | Performed by: PHYSICIAN ASSISTANT

## 2022-01-11 PROCEDURE — 1101F PR PT FALLS ASSESS DOC 0-1 FALLS W/OUT INJ PAST YR: ICD-10-PCS | Mod: CPTII,S$GLB,, | Performed by: PHYSICIAN ASSISTANT

## 2022-01-11 PROCEDURE — 1125F AMNT PAIN NOTED PAIN PRSNT: CPT | Mod: CPTII,S$GLB,, | Performed by: PHYSICIAN ASSISTANT

## 2022-01-11 PROCEDURE — 1159F MED LIST DOCD IN RCRD: CPT | Mod: CPTII,S$GLB,, | Performed by: PHYSICIAN ASSISTANT

## 2022-01-11 PROCEDURE — 3077F SYST BP >= 140 MM HG: CPT | Mod: CPTII,S$GLB,, | Performed by: PHYSICIAN ASSISTANT

## 2022-01-11 PROCEDURE — 1125F PR PAIN SEVERITY QUANTIFIED, PAIN PRESENT: ICD-10-PCS | Mod: CPTII,S$GLB,, | Performed by: PHYSICIAN ASSISTANT

## 2022-01-11 PROCEDURE — 99024 PR POST-OP FOLLOW-UP VISIT: ICD-10-PCS | Mod: S$GLB,,, | Performed by: PHYSICIAN ASSISTANT

## 2022-01-11 PROCEDURE — 99024 POSTOP FOLLOW-UP VISIT: CPT | Mod: S$GLB,,, | Performed by: PHYSICIAN ASSISTANT

## 2022-01-11 PROCEDURE — 3288F FALL RISK ASSESSMENT DOCD: CPT | Mod: CPTII,S$GLB,, | Performed by: PHYSICIAN ASSISTANT

## 2022-01-11 PROCEDURE — 97110 THERAPEUTIC EXERCISES: CPT

## 2022-01-11 PROCEDURE — 99999 PR PBB SHADOW E&M-EST. PATIENT-LVL III: CPT | Mod: PBBFAC,,, | Performed by: PHYSICIAN ASSISTANT

## 2022-01-11 PROCEDURE — 99999 PR PBB SHADOW E&M-EST. PATIENT-LVL III: ICD-10-PCS | Mod: PBBFAC,,, | Performed by: PHYSICIAN ASSISTANT

## 2022-01-11 PROCEDURE — 1101F PT FALLS ASSESS-DOCD LE1/YR: CPT | Mod: CPTII,S$GLB,, | Performed by: PHYSICIAN ASSISTANT

## 2022-01-11 PROCEDURE — 1159F PR MEDICATION LIST DOCUMENTED IN MEDICAL RECORD: ICD-10-PCS | Mod: CPTII,S$GLB,, | Performed by: PHYSICIAN ASSISTANT

## 2022-01-11 PROCEDURE — 73110 X-RAY EXAM OF WRIST: CPT | Mod: 26,LT,, | Performed by: RADIOLOGY

## 2022-01-11 PROCEDURE — 3080F DIAST BP >= 90 MM HG: CPT | Mod: CPTII,S$GLB,, | Performed by: PHYSICIAN ASSISTANT

## 2022-01-11 PROCEDURE — 3288F PR FALLS RISK ASSESSMENT DOCUMENTED: ICD-10-PCS | Mod: CPTII,S$GLB,, | Performed by: PHYSICIAN ASSISTANT

## 2022-01-11 PROCEDURE — 1160F PR REVIEW ALL MEDS BY PRESCRIBER/CLIN PHARMACIST DOCUMENTED: ICD-10-PCS | Mod: CPTII,S$GLB,, | Performed by: PHYSICIAN ASSISTANT

## 2022-01-11 PROCEDURE — 73110 XR WRIST COMPLETE 3 VIEWS LEFT: ICD-10-PCS | Mod: 26,LT,, | Performed by: RADIOLOGY

## 2022-01-11 NOTE — PROGRESS NOTES
"  [facility]  Occupational Therapy Treatment Note    Date: 1/11/2022  Name: Cindy WymanAmerica  Clinic Number: [id number]    Medical Diagnosis: Left DRF with ORIF 11/29/21     ICD-10: S52.502A (ICD-10-CM) - Closed fracture of distal end of left radius, unspecified fracture morphology, initial encounter     Therapy Diagnosis:        Encounter Diagnoses   Name Primary?    Closed fracture of distal end of left radius, unspecified fracture morphology, initial encounter      Range of motion deficit      Wrist pain, left      Decreased  strength of left hand           Physician: Dr. NELSON Peoples  Physician Orders: S/p L DRFx ORIF 11/29/21- to start therapy 2 weeks postop     Surgical Procedure and Date: 11/29/21, Open Reduction internal fixation of left intraarticular distal radius fracture four part intra-articular     Evaluation Date: 12/22/2021     Plan of Care Certification Period: 2/22/22        Date of Return to MD / Progress Note due : 1/11/22     Visit # / Visits authorized: 2/8 (20 total)   Insurance Authorization Period Expiration: 4/1/2022     FOTO: unavailable      Precautions:  Standard and Weightbearing     Time In:1015 am   Time Out: 11   Total Appointment Time (timed & untimed codes): 45  minutes    SUBJECTIVE     Pt reports: "Just stiff in the morning, but no real pain, and my motion is about 60% better."   Patient to be traveling for work; on/off jan-feb.  Will schedule as able.   She was compliant with home exercise program given last session. 4x daily per report   Response to previous treatment:more motion, less pain   Functional change: using for light activity - holding coffee cup, towel;  not able to  tight for thumb and not using it at gym    Pain: 0/10  Location: left wrist, hand, stiffness only       PER PA 1/11/22 - fracture still healing, no weightbearing, light hand use, 1-2 lbs.only;  No carrying or pulling luggage, possible SLL sprain noted on " "xray, will continue to monitor.     OBJECTIVE     Objective Measures updated at progress report unless specified.    1/11/2022:    R)  50    L) 15.3 lbs. avg   Key R) 11    L) 6   3pt R)  13     L) 4   2pt R) 7  L) 4    Treatment   6 weeks post op    Cindy received the treatments listed below:     Supervised modalities after being cleared for contradictions for 10 minutes:   -Patient received paraffin bath to LEFT  hand  for 10 minutes to increase blood flow, circulation, pain management and for tissue elasticity prior to therex.      Therapeutic exercises for 20 minutes including:  -hook, wave, flat and full fist, thumb flexion, opposition x 10 reps each   - AROM wrist flex, ext, RD, UD, sup/pron x 10 reps each   - added wrist PRE's 2# wrist flex, ext, RD, UD, x 10 reps each   - 0# hammer for sup/pron and wrist circumduction x 10 reps each   - mixed putty for gross , key, 2 and 3 pt pinch, thumb press and composite extension "donut" x 10 reps each.   - TOD varigrip for thumb press red 50% x 10 reps   -4 # clothespin with pom poms x 30 reps for pinch strengthening.   - reviewed modality use for pain, stiffness , swelling   - no weightbearing    Manual therapy techniques: for 15 min (including Joint mobilizations, Myofacial release, Manual Lymphatic Drainage, Soft tissue Mobilization, and Friction Massage)  were applied to the left hand/wrist  as follows:   - scar massage to decrease adhesions and improve tensile glide   - RM to hand/wrist to decrease edema and to promote lymphatic drainage       Patient Education and Home Exercises      Education provided:   - Cont HEP   -provided putty and upgraded PRE handout   - Progress towards goals     Written Home Exercises Provided: Patient instructed to cont prior HEP.  Exercises were reviewed and Cindy was able to demonstrate them prior to the end of the session.  Cindy demonstrated good  understanding of the HEP provided. See EMR under Patient " Instructions for exercises provided during therapy sessions.       Assessment       Cindy is demonstrating improved ROM, however, endrange for flex/ext remain limited.   and pinch strength remain limited as compared to dominant R hand.  Will progress gradually with , pinch and wrist strengthening.  Pain remains manageable.     Cindy is progressing well towards her goals and there are no updates to goals at this time. Pt prognosis is Good.     Pt will continue to benefit from skilled outpatient occupational therapy to address the deficits listed in the problem list on initial evaluation provide pt/family education and to maximize pt's level of independence in the home and community environment.     Pt's spiritual, cultural and educational needs considered and pt agreeable to plan of care and goals.    Anticipated barriers to occupational therapy: none     Goals:   Short Term Goals (4 weeks)   1)  Patient to be IND with HEP and modalities for pain management- met  2)  Increase ROM 3-10 degrees to increase functional hand use for ADLs/work/leisure activities  3)   Decrease edema .1-.5mm to increase joint mobility /flexibility for functional hand use.   4)  Assess  and pinch and set goals accordingly - met         Long Term Goals (8 weeks)   1)  Increase  strength 2-8 lbs. For PULLING LUGGAGE FOR TRAVELING AND WORKING OUT   2)  Increase pinch strength 1-4 psi's for opening bottles and FM activities   3)  Increase ROM 11-20 degrees to increase functional hand use for ADLs/work/leisure activities        Plan   Recommend continued Outpatient Occupataional Therapy  1x week for 8 weeks to include the following interventions Paraffin, Manual therapy/joint mobilizations, Modalities for pain management, US 3 mhz, Therapeutic exercises/activities., Strengthening, Edema Control, Scar Management, Wound Care and Electrical Modalities.     Within the Plan of Care Certification: 12/22/2021 to 2/22/2022.        Lissette Vega, OT  , CHT

## 2022-02-01 ENCOUNTER — CLINICAL SUPPORT (OUTPATIENT)
Dept: REHABILITATION | Facility: HOSPITAL | Age: 77
End: 2022-02-01
Payer: MEDICARE

## 2022-02-01 DIAGNOSIS — M25.60 RANGE OF MOTION DEFICIT: ICD-10-CM

## 2022-02-01 DIAGNOSIS — M25.532 WRIST PAIN, LEFT: ICD-10-CM

## 2022-02-01 DIAGNOSIS — R29.898 DECREASED GRIP STRENGTH OF LEFT HAND: ICD-10-CM

## 2022-02-01 PROCEDURE — 97110 THERAPEUTIC EXERCISES: CPT

## 2022-02-01 PROCEDURE — 97018 PARAFFIN BATH THERAPY: CPT | Mod: 59

## 2022-02-01 PROCEDURE — 97140 MANUAL THERAPY 1/> REGIONS: CPT

## 2022-02-01 NOTE — PROGRESS NOTES
"  [facility]  Occupational Therapy Treatment Note    Date: 2/1/2022  Name: Cindy WymanAmerica  Clinic Number: [id number]    Medical Diagnosis: Left DRF with ORIF 11/29/21     ICD-10: S52.502A (ICD-10-CM) - Closed fracture of distal end of left radius, unspecified fracture morphology, initial encounter     Therapy Diagnosis:        Encounter Diagnoses   Name Primary?    Closed fracture of distal end of left radius, unspecified fracture morphology, initial encounter      Range of motion deficit      Wrist pain, left      Decreased  strength of left hand           Physician: Dr. NELSON Peoples  Physician Orders: S/p L DRFx ORIF 11/29/21- to start therapy 2 weeks postop     Surgical Procedure and Date: 11/29/21, Open Reduction internal fixation of left intraarticular distal radius fracture four part intra-articular     Evaluation Date: 12/22/2021     Plan of Care Certification Period: 2/22/22        Date of Return to MD / Progress Note due : 1/11/22     Visit # / Visits authorized: 3/8 (20 total)   Insurance Authorization Period Expiration: 4/1/2022     FOTO: unavailable      Precautions:  Standard and Weightbearing     Time In:1100 am   Time Out: 12  Total Appointment Time (timed & untimed codes): 45  minutes    SUBJECTIVE     Pt reports: "I think its doing ok, still a little stiff, but I'm doing more, and still traveling for work"   Patient to be traveling for work; on/off jan-feb.  Will schedule as able.   She was compliant with home exercise program given last session. 4x daily per report   Response to previous treatment:more motion, less pain   Functional change: using for light activity - holding coffee cup, towel;  not able to  tight for thumb and not using it at gym    Pain: 0/10  Location: left wrist, hand, stiffness only       PER PA 1/11/22 - fracture still healing, no weightbearing, light hand use, 1-2 lbs.only;  No carrying or pulling luggage, possible SLL sprain " "noted on xray, will continue to monitor.     OBJECTIVE     Objective Measures updated at progress report unless specified.    1/11/2022:    R)  50    L) 15.3 lbs. avg   Key R) 11    L) 6   3pt R)  13     L) 4   2pt R) 7  L) 4    Treatment   8+ weeks post op    Cindy received the treatments listed below:     Supervised modalities after being cleared for contradictions for 10 minutes:   -Patient received paraffin bath to LEFT  hand  for 10 minutes to increase blood flow, circulation, pain management and for tissue elasticity prior to therex.      Therapeutic exercises for 20 minutes including:  -hook, wave, flat and full fist, thumb flexion, opposition x 10 reps each   - AROM wrist flex, ext, RD, UD, sup/pron x 10 reps each   - added wrist PRE's 2# wrist flex, ext, RD, UD, x 10 reps each   - 0# hammer for sup/pron and wrist circumduction x 10 reps each   - mixed putty for gross , key, 2 and 3 pt pinch, thumb press and composite extension "donut" x 10 reps each.   - yellow digi extender for composite extension x 10 reps   - TOD varigrip for thumb press red 50% x 10 reps and 10 reps for thumb extension    -4 # clothespin with pom poms x 30 reps for pinch strengthening.   -red therabar for wrist flex/ext, smiles and frowns x 10 reps each for wrist strengthening.   - reviewed modality use for pain, stiffness , swelling   - no weightbearing    Manual therapy techniques: for 15 min (including Joint mobilizations, Myofacial release, Manual Lymphatic Drainage, Soft tissue Mobilization, and Friction Massage)  were applied to the left hand/wrist  as follows:   - scar massage to decrease adhesions and improve tensile glide       Patient Education and Home Exercises      Education provided:   - Cont HEP   -provided putty and upgraded PRE handout   - Progress towards goals     Written Home Exercises Provided: Patient instructed to cont prior HEP.  Exercises were reviewed and Cindy was able to demonstrate them prior " to the end of the session.  Cindy demonstrated good  understanding of the HEP provided. See EMR under Patient Instructions for exercises provided during therapy sessions.       Assessment       Cindy is demonstrating improved ROM, however, endrange for flex/ext remain limited.   and pinch strength remain limited as compared to dominant R hand.  Will progress gradually with , pinch and wrist strengthening.  Pain remains manageable.     Cindy is progressing well towards her goals and there are no updates to goals at this time. Pt prognosis is Good.     Pt will continue to benefit from skilled outpatient occupational therapy to address the deficits listed in the problem list on initial evaluation provide pt/family education and to maximize pt's level of independence in the home and community environment.     Pt's spiritual, cultural and educational needs considered and pt agreeable to plan of care and goals.    Anticipated barriers to occupational therapy: none     Goals:   Short Term Goals (4 weeks)   1)  Patient to be IND with HEP and modalities for pain management- met  2)  Increase ROM 3-10 degrees to increase functional hand use for ADLs/work/leisure activities  3)   Decrease edema .1-.5mm to increase joint mobility /flexibility for functional hand use.   4)  Assess  and pinch and set goals accordingly - met         Long Term Goals (8 weeks)   1)  Increase  strength 2-8 lbs. For PULLING LUGGAGE FOR TRAVELING AND WORKING OUT   2)  Increase pinch strength 1-4 psi's for opening bottles and FM activities   3)  Increase ROM 11-20 degrees to increase functional hand use for ADLs/work/leisure activities        Plan   Recommend continued Outpatient Occupataional Therapy  1x week for 8 weeks to include the following interventions Paraffin, Manual therapy/joint mobilizations, Modalities for pain management, US 3 mhz, Therapeutic exercises/activities., Strengthening, Edema Control, Scar Management,  Wound Care and Electrical Modalities.     Within the Plan of Care Certification: 12/22/2021 to 2/22/2022.       Lissette Vega OT  , CHT

## 2022-02-10 ENCOUNTER — CLINICAL SUPPORT (OUTPATIENT)
Dept: REHABILITATION | Facility: HOSPITAL | Age: 77
End: 2022-02-10
Payer: MEDICARE

## 2022-02-10 DIAGNOSIS — R29.898 DECREASED GRIP STRENGTH OF LEFT HAND: ICD-10-CM

## 2022-02-10 DIAGNOSIS — M25.60 RANGE OF MOTION DEFICIT: ICD-10-CM

## 2022-02-10 DIAGNOSIS — M25.532 WRIST PAIN, LEFT: ICD-10-CM

## 2022-02-10 PROCEDURE — 97110 THERAPEUTIC EXERCISES: CPT

## 2022-02-10 PROCEDURE — 97018 PARAFFIN BATH THERAPY: CPT

## 2022-02-10 PROCEDURE — 97140 MANUAL THERAPY 1/> REGIONS: CPT

## 2022-02-10 NOTE — PROGRESS NOTES
"[facility]  Occupational Therapy Treatment Note    Date: 2/10/2022  Name: Cindy Julian  Clinic Number: [id number]    Medical Diagnosis: Left DRF with ORIF 11/29/21     ICD-10: S52.502A (ICD-10-CM) - Closed fracture of distal end of left radius, unspecified fracture morphology, initial encounter     Therapy Diagnosis:   Encounter Diagnoses   Name Primary?    Range of motion deficit     Wrist pain, left     Decreased  strength of left hand      Physician: Dr. NELSON Peoples  Physician Orders: S/p L DRFx ORIF 11/29/21- to start therapy 2 weeks postop     Surgical Procedure and Date: 11/29/21, Open Reduction internal fixation of left intraarticular distal radius fracture four part intra-articular     Evaluation Date: 12/22/2021     Plan of Care Certification Period: 2/22/22        Date of Return to MD / Progress Note due: 2/15/2022     Visit # / Visits authorized: 4 / 8 (20 total)   Insurance Authorization Period Expiration: 4/1/2022     FOTO: unavailable      Precautions:  Standard and Weightbearing     Time In: 2:00 pm   Time Out: 2:57 pm   Total Appointment Time (timed & untimed codes): 57 minutes    SUBJECTIVE     Pt reports: "It's doing well. I believe it's 80% better."  She was compliant with home exercise program given last session. 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     OBJECTIVE     Objective Measures updated at progress report unless specified.    2/10/2022:     R)  50    L) 21.3 lbs avg  (+6)  Key R) 11    L) 8 (+2)  3pt R)  13     L) 5 (+1)  2pt R) 7  L) 5 (+1)    Hand ROM. Measured in degrees. " "     Wrist ext/flex  65/45 (+45/+15)  RD / UD  25/30 (+13/+18)  Sup / pron  85/90 (+35/+10)    Treatment     10+ weeks post op    Cindy received the treatments listed below:     Supervised modalities after being cleared for contradictions for 10 minutes:   -Patient received paraffin bath to LEFT hand  for 10 minutes to increase blood flow, circulation, pain management and for tissue elasticity prior to therex.      Therapeutic exercises for 35 minutes including:  - Updated objective strength and ROM measurements, see above.   - full fist, thumb flexion with place and holds, pinky slides x 10 reps each   - A/PROM wrist flex, ext, RD, UD, sup/pron x 10 reps each - gentle to pain tolerance     - At home: mixed putty for gross , key, 2 and 3 pt pinch, thumb press and composite extension "donut" x 10 reps each.   - red digi extender for composite extension x 10 reps   - TOD varigrip for thumb press red 75% x 10 reps and 10 reps for thumb extension    - 4# clothespin with pom poms x 30 reps for pinch strengthening.   - red therabar for wrist flex/ext, smiles and frowns x 10 reps each for wrist strengthening.   - reviewed modality use for pain, stiffness , swelling   - no weightbearing     Manual therapy techniques: for 12 min (including Joint mobilizations, Myofacial release, Manual Lymphatic Drainage, Soft tissue Mobilization, and Friction Massage) were applied to the left hand/wrist as follows:   - scar massage using vibrating massager to decrease adhesions and improve tensile glide   - Performed Retrograde massage to left fingers/hand/wrist to decrease edema, improve joint mobility, decrease pain and improve lymphatic drainage to increase hand function.   - Performed soft tissue mobilization/massage to L wrist/forearm using gua sha tool to relieve associated soft tissue tightness, improve joint mobility, decrease pain, and improve lymphatic drainage to increase ROM.     Patient Education and Home Exercises  "     Education provided:   - Cont HEP including gentle wrist PROM to pain tolerance  - Progress towards goals     Written Home Exercises Provided: Patient instructed to cont prior HEP.  Exercises were reviewed and Cindy was able to demonstrate them prior to the end of the session.  Cindy demonstrated good  understanding of the HEP provided. See EMR under Patient Instructions for exercises provided during therapy sessions.       Assessment     Cindy had good tolerance to treatment this date. She continues to endorse adherence to HEP. She presents with good improvements in wrist/forearm ROM since eval and fair improvements in hand strength evidenced by updated measurements.  and pinch strength remain limited as compared to dominant R hand. Will cont to progress gradually with , pinch, and wrist strengthening. Pain remains manageable and is improving.     Cindy is progressing well towards her goals and there are no updates to goals at this time. Pt prognosis is Good.     Pt will continue to benefit from skilled outpatient occupational therapy to address the deficits listed in the problem list on initial evaluation provide pt/family education and to maximize pt's level of independence in the home and community environment.     Pt's spiritual, cultural and educational needs considered and pt agreeable to plan of care and goals.    Anticipated barriers to occupational therapy: none     Goals:   Short Term Goals (4 weeks)   1)  Patient to be IND with HEP and modalities for pain management- met  2)  Increase ROM 3-10 degrees to increase functional hand use for ADLs/work/leisure activities - met  3)   Decrease edema .1-.5mm to increase joint mobility /flexibility for functional hand use. ------progressing not met 2/10/2022  4)  Assess  and pinch and set goals accordingly - met         Long Term Goals (8 weeks)   1)  Increase  strength 2-8 lbs. For PULLING LUGGAGE FOR TRAVELING AND WORKING OUT -  met  2)  Increase pinch strength 1-4 psi's for opening bottles and FM activities - met  3)  Increase ROM 11-20 degrees to increase functional hand use for ADLs/work/leisure activities - met     Plan   Recommend continued Outpatient Occupataional Therapy  1x week for 8 weeks to include the following interventions Paraffin, Manual therapy/joint mobilizations, Modalities for pain management, US 3 mhz, Therapeutic exercises/activities., Strengthening, Edema Control, Scar Management, Wound Care and Electrical Modalities.     Within the Plan of Care Certification: 12/22/2021 to 2/22/2022.       Imelda Shaikh, OTR/L

## 2022-02-11 ENCOUNTER — TELEPHONE (OUTPATIENT)
Dept: ORTHOPEDICS | Facility: CLINIC | Age: 77
End: 2022-02-11
Payer: MEDICARE

## 2022-02-15 ENCOUNTER — OFFICE VISIT (OUTPATIENT)
Dept: ORTHOPEDICS | Facility: CLINIC | Age: 77
End: 2022-02-15
Payer: MEDICARE

## 2022-02-15 ENCOUNTER — HOSPITAL ENCOUNTER (OUTPATIENT)
Dept: RADIOLOGY | Facility: OTHER | Age: 77
Discharge: HOME | End: 2022-02-15
Attending: ORTHOPAEDIC SURGERY
Payer: MEDICARE

## 2022-02-15 VITALS — BODY MASS INDEX: 23.04 KG/M2 | HEIGHT: 68 IN | WEIGHT: 152 LBS

## 2022-02-15 DIAGNOSIS — M25.532 LEFT WRIST PAIN: ICD-10-CM

## 2022-02-15 DIAGNOSIS — S52.502A CLOSED FRACTURE OF DISTAL END OF LEFT RADIUS, UNSPECIFIED FRACTURE MORPHOLOGY, INITIAL ENCOUNTER: Primary | ICD-10-CM

## 2022-02-15 DIAGNOSIS — Z47.89 ORTHOPEDIC AFTERCARE: ICD-10-CM

## 2022-02-15 PROCEDURE — 3288F FALL RISK ASSESSMENT DOCD: CPT | Mod: CPTII,S$GLB,, | Performed by: PHYSICIAN ASSISTANT

## 2022-02-15 PROCEDURE — 73110 X-RAY EXAM OF WRIST: CPT | Mod: 26,LT,, | Performed by: RADIOLOGY

## 2022-02-15 PROCEDURE — 1159F PR MEDICATION LIST DOCUMENTED IN MEDICAL RECORD: ICD-10-PCS | Mod: CPTII,S$GLB,, | Performed by: PHYSICIAN ASSISTANT

## 2022-02-15 PROCEDURE — 73110 X-RAY EXAM OF WRIST: CPT | Mod: TC,FY,LT

## 2022-02-15 PROCEDURE — 1160F RVW MEDS BY RX/DR IN RCRD: CPT | Mod: CPTII,S$GLB,, | Performed by: PHYSICIAN ASSISTANT

## 2022-02-15 PROCEDURE — 73110 XR WRIST COMPLETE 3 VIEWS LEFT: ICD-10-PCS | Mod: 26,LT,, | Performed by: RADIOLOGY

## 2022-02-15 PROCEDURE — 1159F MED LIST DOCD IN RCRD: CPT | Mod: CPTII,S$GLB,, | Performed by: PHYSICIAN ASSISTANT

## 2022-02-15 PROCEDURE — 99999 PR PBB SHADOW E&M-EST. PATIENT-LVL III: ICD-10-PCS | Mod: PBBFAC,,, | Performed by: PHYSICIAN ASSISTANT

## 2022-02-15 PROCEDURE — 99024 PR POST-OP FOLLOW-UP VISIT: ICD-10-PCS | Mod: S$GLB,,, | Performed by: PHYSICIAN ASSISTANT

## 2022-02-15 PROCEDURE — 3288F PR FALLS RISK ASSESSMENT DOCUMENTED: ICD-10-PCS | Mod: CPTII,S$GLB,, | Performed by: PHYSICIAN ASSISTANT

## 2022-02-15 PROCEDURE — 1160F PR REVIEW ALL MEDS BY PRESCRIBER/CLIN PHARMACIST DOCUMENTED: ICD-10-PCS | Mod: CPTII,S$GLB,, | Performed by: PHYSICIAN ASSISTANT

## 2022-02-15 PROCEDURE — 1101F PT FALLS ASSESS-DOCD LE1/YR: CPT | Mod: CPTII,S$GLB,, | Performed by: PHYSICIAN ASSISTANT

## 2022-02-15 PROCEDURE — 99024 POSTOP FOLLOW-UP VISIT: CPT | Mod: S$GLB,,, | Performed by: PHYSICIAN ASSISTANT

## 2022-02-15 PROCEDURE — 99999 PR PBB SHADOW E&M-EST. PATIENT-LVL III: CPT | Mod: PBBFAC,,, | Performed by: PHYSICIAN ASSISTANT

## 2022-02-15 PROCEDURE — 1126F PR PAIN SEVERITY QUANTIFIED, NO PAIN PRESENT: ICD-10-PCS | Mod: CPTII,S$GLB,, | Performed by: PHYSICIAN ASSISTANT

## 2022-02-15 PROCEDURE — 1101F PR PT FALLS ASSESS DOC 0-1 FALLS W/OUT INJ PAST YR: ICD-10-PCS | Mod: CPTII,S$GLB,, | Performed by: PHYSICIAN ASSISTANT

## 2022-02-15 PROCEDURE — 1126F AMNT PAIN NOTED NONE PRSNT: CPT | Mod: CPTII,S$GLB,, | Performed by: PHYSICIAN ASSISTANT

## 2022-02-15 NOTE — PROGRESS NOTES
"Ms. Cross is here today for a post-operative visit.  She is 78 days status post ORIF left distal radius by Dr. De Leon on 11/29/2021. She reports that she is doing well. She is attending OT and regularly performing HEP.  She is icing as needed. She has been weaning out of the brace, only using in public. No current pain.  She denies fever, chills, and sweats since the time of the surgery.     Physical exam:    Vitals:    02/15/22 1112   Weight: 68.9 kg (152 lb)   Height: 5' 8" (1.727 m)   PainSc: 0-No pain     Vital signs are stable, patient is afebrile.  Patient is well dressed and well groomed, no acute distress.  Alert and oriented to person, place, and time.  Incision is healing well - clean, dry, and intact.  There is no erythema or exudate.  There is no sign of any infection. She is NVI.   Good finger ROM, fair wrist ROM.  Good pronation and supination.    Assessment: 78 days status post ORIF left distal radius    Plan:  Cindy was seen today for post-op evaluation.    Diagnoses and all orders for this visit:    Closed fracture of distal end of left radius, unspecified fracture morphology, initial encounter    Orthopedic aftercare          - left forearm brace - wean out  - Regular OT and HEP, will gradually strengthen in OT  - Discussed XRay, SL interval looks stable  - Follow up as needed  - Call with questions or concerns        PLAN FOR THE PATIENT: Pt will be NWB of operative extremity for 6 weeks. Pt will return to clinic at 2 weeks postop for a wound check and will be placed in a removable wrist brace to begin ROM at the wrist      "

## 2022-02-16 ENCOUNTER — CLINICAL SUPPORT (OUTPATIENT)
Dept: REHABILITATION | Facility: HOSPITAL | Age: 77
End: 2022-02-16
Payer: MEDICARE

## 2022-02-16 DIAGNOSIS — R29.898 DECREASED GRIP STRENGTH OF LEFT HAND: ICD-10-CM

## 2022-02-16 DIAGNOSIS — M25.532 WRIST PAIN, LEFT: ICD-10-CM

## 2022-02-16 DIAGNOSIS — M25.60 RANGE OF MOTION DEFICIT: ICD-10-CM

## 2022-02-16 PROCEDURE — 97018 PARAFFIN BATH THERAPY: CPT | Mod: 59

## 2022-02-16 PROCEDURE — 97140 MANUAL THERAPY 1/> REGIONS: CPT

## 2022-02-16 PROCEDURE — 97110 THERAPEUTIC EXERCISES: CPT

## 2022-02-16 NOTE — PROGRESS NOTES
"[facility]  Occupational Therapy Treatment Note    Date: 2/16/2022  Name: Cindy Cross  Clinic Number: [id number]    Medical Diagnosis: Left DRF with ORIF 11/29/21     ICD-10: S52.502A (ICD-10-CM) - Closed fracture of distal end of left radius, unspecified fracture morphology, initial encounter     Therapy Diagnosis:   Encounter Diagnoses   Name Primary?    Range of motion deficit     Wrist pain, left     Decreased  strength of left hand      Physician: Dr. NELSON Peoples  Physician Orders: S/p L DRFx ORIF 11/29/21- to start therapy 2 weeks postop     Surgical Procedure and Date: 11/29/21, Open Reduction internal fixation of left intraarticular distal radius fracture four part intra-articular     Evaluation Date: 12/22/2021     Plan of Care Certification Period: 2/22/22     Date of Return to MD / Progress Note due: 2/15/2022     Visit # / Visits authorized: 5 / 8 (20 total)   Insurance Authorization Period Expiration: 4/1/2022     FOTO: unavailable      Precautions:  Standard and Weightbearing     Time In:3:00 pm   Time Out: 3:45 pm   Total Appointment Time (timed & untimed codes): 45 minutes    Re-assess 3/2 and anticipate discharge or update POC  SUBJECTIVE     Pt reports: "the thumb is just stiff, but PA said I can do more as long as it doesn't hurt. "     She was compliant with home exercise program given last session. 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    2/10/2022:     R)  50    L) 21.3 lbs avg  (+6)  Key R) 11    L) 8 (+2)  3pt R)  13     L) 5 (+1)  2pt R) 7  L) 5 (+1)    Hand ROM. Measured in degrees.      Wrist ext/flex  65/45 (+45/+15)  RD / UD  25/30 (+13/+18)  Sup / pron  85/90 (+35/+10)    Treatment " "    10+ weeks post op    Cindy received the treatments listed below:     Supervised modalities after being cleared for contradictions for 10 minutes:   -Patient received paraffin bath to LEFT hand  for 10 minutes to increase blood flow, circulation, pain management and for tissue elasticity prior to therex.      Therapeutic exercises for 25 minutes including:  - full fist, thumb flexion with place and holds, pinky slides x 10 reps each   - A/PROM wrist flex, ext, RD, UD, sup/pron x 10 reps each - gentle to pain tolerance    wrist PRE's 4# wrist flex, 2# for  ext, RD, UD, x 10 reps each 1# hammer for sup/pron and wrist circumduction x 10 reps each   - At home: mixed putty for gross , key, 2 and 3 pt pinch, thumb press and composite extension "donut" x 10 reps each.   - red digi extender for composite extension x 10 reps   - TOD varigrip for thumb press red 100% x 10 reps and 10 reps for thumb extension    - 4# clothespin with pom poms x 30 reps for pinch strengthening.   - red therabar for  smiles and frowns x 10 reps each for wrist strengthening.   - reviewed modality use for pain, stiffness , swelling   - weightbearing as tolerated.    Manual therapy techniques: for 10 min (including Joint mobilizations, Myofacial release, Manual Lymphatic Drainage, Soft tissue Mobilization, and Friction Massage) were applied to the left hand/wrist as follows:   - scar massage using vibrating massager to decrease adhesions and improve tensile glide   - Performed Retrograde massage to left fingers/hand/wrist to decrease edema, improve joint mobility, decrease pain and improve lymphatic drainage to increase hand function.   - Performed soft tissue mobilization/massage to L wrist/forearm using gua sha tool to relieve associated soft tissue tightness, improve joint mobility, decrease pain, and improve lymphatic drainage to increase ROM.     Patient Education and Home Exercises      Education provided:   - Cont HEP including " gentle wrist PROM to pain tolerance  - Progress towards goals     Written Home Exercises Provided: Patient instructed to cont prior HEP.  Exercises were reviewed and Cindy was able to demonstrate them prior to the end of the session.  Cindy demonstrated good  understanding of the HEP provided. See EMR under Patient Instructions for exercises provided during therapy sessions.       Assessment     Cindy had good tolerance to treatment this date. She continues to endorse adherence to HEP. She presents with good improvements in wrist/forearm ROM since eval and fair improvements in hand strength evidenced by updated measurements.  and pinch strength remain limited as compared to dominant R hand. Will cont to progress gradually with , pinch, and wrist strengthening. Pain remains manageable and is improving.     Cindy is progressing well towards her goals and there are no updates to goals at this time. Pt prognosis is Good.     Pt will continue to benefit from skilled outpatient occupational therapy to address the deficits listed in the problem list on initial evaluation provide pt/family education and to maximize pt's level of independence in the home and community environment.     Pt's spiritual, cultural and educational needs considered and pt agreeable to plan of care and goals.    Anticipated barriers to occupational therapy: none     Goals:   Short Term Goals (4 weeks)   1)  Patient to be IND with HEP and modalities for pain management- met  2)  Increase ROM 3-10 degrees to increase functional hand use for ADLs/work/leisure activities - met  3)   Decrease edema .1-.5mm to increase joint mobility /flexibility for functional hand use. ------progressing not met 2/16/2022  4)  Assess  and pinch and set goals accordingly - met         Long Term Goals (8 weeks)   1)  Increase  strength 2-8 lbs. For PULLING LUGGAGE FOR TRAVELING AND WORKING OUT - met  2)  Increase pinch strength 1-4 psi's for  opening bottles and FM activities - met  3)  Increase ROM 11-20 degrees to increase functional hand use for ADLs/work/leisure activities - met     Plan   Recommend continued Outpatient Occupataional Therapy  1x week for 8 weeks to include the following interventions Paraffin, Manual therapy/joint mobilizations, Modalities for pain management, US 3 mhz, Therapeutic exercises/activities., Strengthening, Edema Control, Scar Management, Wound Care and Electrical Modalities.     Within the Plan of Care Certification: 12/22/2021 to 2/22/2022.       Lissette Vega, OTR/L

## (undated) DEVICE — SPLINT PLASTER FAST SET 5X30IN

## (undated) DEVICE — SCREW BNE N LOK HEXLB 3.5X14
Type: IMPLANTABLE DEVICE | Site: ARM | Status: NON-FUNCTIONAL
Removed: 2021-11-29

## (undated) DEVICE — BANDAGE MATRIX HK LOOP 4IN 5YD

## (undated) DEVICE — BANDAGE GAUZE 6PLY FLUFF 2X3

## (undated) DEVICE — DRILL QUICK RELEASE 2.8MM 5IN

## (undated) DEVICE — NDL SAFETY 22G X 1.5 ECLIPSE

## (undated) DEVICE — SOL PVP-I SCRUB 7.5% 4OZ

## (undated) DEVICE — SYR B-D DISP CONTROL 10CC100/C

## (undated) DEVICE — BLADE SURG STAINLESS STEEL #15

## (undated) DEVICE — TOURNIQUET SB QC DP 18X4IN

## (undated) DEVICE — APPLICATOR CHLORAPREP ORN 26ML

## (undated) DEVICE — SUT 4/0 18IN ETHILON BL P3

## (undated) DEVICE — DRAPE SURG W/TWL 17 5/8X23

## (undated) DEVICE — SUT 4-0 VICRYL / SH

## (undated) DEVICE — BUCKET PLASTER DISPOSABLE

## (undated) DEVICE — SUT BLU BR 4-0 W/DMD PT 3/8

## (undated) DEVICE — GLOVE BIOGEL PI MICRO INDIC 7

## (undated) DEVICE — SEE MEDLINE ITEM 157131

## (undated) DEVICE — GAUZE SPONGE 4X4 12PLY

## (undated) DEVICE — DRESSING N ADH OIL EMUL 3X3

## (undated) DEVICE — DRESSING XEROFORM FOIL PK 1X8

## (undated) DEVICE — SLING ARM LARGE FOAM STRAP

## (undated) DEVICE — BIT DRILL QUICK RELEASE 2.0MM

## (undated) DEVICE — Device

## (undated) DEVICE — FORCEP STRAIGHT DISP

## (undated) DEVICE — DRAPE C-ARM MINI DISP

## (undated) DEVICE — SUT VICRYL 4-0 18 P-3

## (undated) DEVICE — CORD BIPOLAR 12 FOOT

## (undated) DEVICE — SOL 9P NACL IRR PIC IL

## (undated) DEVICE — PAD CAST SPECIALIST STRL 4

## (undated) DEVICE — GLOVE BIOGEL ECLIPSE SZ 7

## (undated) DEVICE — SCREW BONE 2.3 X 24MM
Type: IMPLANTABLE DEVICE | Site: ARM | Status: NON-FUNCTIONAL
Removed: 2021-11-29